# Patient Record
Sex: MALE | Race: WHITE | Employment: PART TIME | URBAN - METROPOLITAN AREA
[De-identification: names, ages, dates, MRNs, and addresses within clinical notes are randomized per-mention and may not be internally consistent; named-entity substitution may affect disease eponyms.]

---

## 2016-10-04 LAB — EF %, EXTERNAL: NORMAL

## 2017-01-03 ENCOUNTER — HOSPITAL ENCOUNTER (OUTPATIENT)
Dept: ULTRASOUND IMAGING | Age: 22
Discharge: HOME | End: 2017-01-03
Attending: INTERNAL MEDICINE
Payer: COMMERCIAL

## 2017-01-03 DIAGNOSIS — R74.8 ELEVATED LIVER ENZYMES: ICD-10-CM

## 2017-01-03 PROCEDURE — 76700 US EXAM ABDOM COMPLETE: CPT

## 2017-01-20 ENCOUNTER — OFFICE VISIT (OUTPATIENT)
Dept: HEMATOLOGY | Age: 22
End: 2017-01-20

## 2017-01-20 VITALS
WEIGHT: 158 LBS | TEMPERATURE: 96.6 F | DIASTOLIC BLOOD PRESSURE: 70 MMHG | OXYGEN SATURATION: 93 % | SYSTOLIC BLOOD PRESSURE: 94 MMHG | HEART RATE: 72 BPM | HEIGHT: 63 IN | BODY MASS INDEX: 28 KG/M2

## 2017-01-20 DIAGNOSIS — R74.8 ELEVATED LIVER ENZYMES: Primary | ICD-10-CM

## 2017-01-20 NOTE — PROGRESS NOTES
Louie Walton MD, Chyrel Goltz, NP [place], PA-C Rufina Hugh, MD, FACP, MD Meron Hobbs NP Lendia Oiler, NP        [address], [address] [place]  22.     [phone]     FAX: [address], [id number] [place], [place] [address], [address]     [phone]     FAX: [phone]         Patient Care Team:  Rosalia Kim MD as PCP - General (Family Practice)      Problem List  Date Reviewed: 12/23/2016          Codes Class Noted    Delay, puberty ICD-10-CM: E30.0  ICD-9-CM: 259.0  11/3/2016        Elevated LFTs ICD-10-CM: R79.89  ICD-9-CM: 790.6  11/3/2016        Eczema ICD-10-CM: L30.9  ICD-9-CM: 692.9  8/19/2013        Asthma ICD-10-CM: J45.909  ICD-9-CM: 493.90  Unknown        ADD (attention deficit disorder with hyperactivity) ICD-9-CM: 314.01  Unknown                Hawa Leonard returns to the [facility] for management of elevated liver transaminases and elevated alkaline phosphatase. The active problem list, all pertinent past medical history, medications, radiologic findings and laboratory findings related to the liver disorder were reviewed with the patient. The patient is a 24 y.o.  male who was first noted to have abnormalities in liver transaminases and alkaline phosphate in 3/2016 when he was living in [facility]. Serologic evaluation for markers of chronic liver disease were all negative. Ultrasound of the liver was performed in 1/2017. Results suggest that the liver is normal.      Assessment of liver fibrosis was performed in the office today with Fibroscan. The result was 4.6 kPa which correlates with none-mild fibrosis.     The patient had not started any new medications within 3 months preceeding the elevation in liver chemistries. The most recent laboratory studies indicate that the liver transaminases are elevated, ALP is elevated, tests of hepatic synthetic and metabolic function are normal, and the platelet count is normal.    The patient has no symptoms which could be attributed to the liver disorder. The patient completes all daily activities without any functional limitations. The patient has not experienced fatigue, pain in the right side over the liver, yellowing of the eyes or skin, dark urine,       ALLERGIES  No Known Allergies    MEDICATIONS  Current Outpatient Prescriptions   Medication Sig    pantoprazole (PROTONIX) 40 mg tablet Take 1 Tab by mouth daily. Indications: HEARTBURN    montelukast (SINGULAIR) 10 mg tablet Take 1 Tab by mouth daily.  fluticasone (FLOVENT HFA) 44 mcg/actuation inhaler Take 2 Puffs by inhalation two (2) times a day.  triamcinolone acetonide (KENALOG) 0.1 % topical cream Apply  to affected area two (2) times a day. use thin layer    budesonide (PULMICORT) 180 mcg/actuation AePB inhaler Take 2 Puffs by inhalation two (2) times a day.  albuterol (PROVENTIL VENTOLIN) 2.5 mg /3 mL (0.083 %) nebulizer solution 3 mL by Nebulization route every four (4) hours as needed for Wheezing or Shortness of Breath.  escitalopram (LEXAPRO) 20 mg tablet Take 20 mg by mouth daily.  lithium 300 mg tablet Take 300 mg by mouth two (2) times a day.  methylphenidate (CONCERTA) 18 mg CR tablet Take  by mouth every morning.  loratadine (CLARITIN) 10 mg tablet Take 10 mg by mouth daily. No current facility-administered medications for this visit. SYSTEM REVIEW NOT RELATED TO LIVER DISEASE OR REVIEWED ABOVE:  Constitution systems: Negative for fever, chills, weight gain, weight loss. Eyes: Negative for visual changes. ENT: Negative for sore throat, painful swallowing. Respiratory: Negative for cough, hemoptysis, SOB.    Cardiology: Negative for chest pain, palpitations. GI:  Negative for constipation or diarrhea. : Negative for urinary frequency, dysuria, hematuria, nocturia. Skin: Negative for rash. Hematology: Negative for easy bruising, blood clots. Musculo-skelatal: Negative for back pain, muscle pain, weakness. Neurologic: Negative for headaches, dizziness, vertigo, memory problems not related to HE. Psychology: Negative for anxiety, depression. FAMILY HISTORY:  The patient is adopted from [place]. SOCIAL HISTORY:  The patient has never been . The patient has no children. The patient will occasionally smoke 1-2 cigarettes daily. The patient consumes alcohol on social occasions never in excess. The patient currently works full time at [employer]. PHYSICAL EXAMINATION:  Visit Vitals    BP 94/70    Pulse 72    Temp 96.6 °F (35.9 °C) (Oral)    Ht 5' 3\" (1.6 m)    Wt 158 lb (71.7 kg)    SpO2 93%    BMI 27.99 kg/m2     General: No acute distress. Eyes: Sclera anicteric. ENT: No oral lesions. Thyroid normal.  Nodes: No adenopathy. Skin: No spider angiomata. No jaundice. No palmar erythema. Respiratory: Lungs clear to auscultation. Cardiovascular: Regular heart rate. No murmurs. No JVD. Abdomen: Soft non-tender. Liver size normal to percussion/palpation. Spleen not palpable. No obvious ascites. Extremities: No edema. No muscle wasting. No gross arthritic changes. Neurologic: Alert and oriented. Cranial nerves grossly intact. No asterixis.     LABORATORY STUDIES:  [facility] of [address] [place] [address] 10/26/2016   WBC 3.4 - 10.8 x10E3/uL 6.2   ANC 1.4 - 7.0 x10E3/uL 3.5   HGB 12.6 - 17.7 g/dL 14.3    - 379 x10E3/uL 240   AST 0 - 40 IU/L 103 (H)   ALT 0 - 44 IU/L 222 (H)   Alk Phos 39 - 117 IU/L 210 (H)   Bili, Total 0.0 - 1.2 mg/dL 0.3   Albumin 3.5 - 5.5 g/dL 4.3   BUN 6 - 20 mg/dL 15   Creat 0.76 - 1.27 mg/dL 0.56 (L)   Na 136 - 144 mmol/L 140   K 3.5 - 5.2 mmol/L 4.2   Cl 97 - 106 mmol/L 102   CO2 18 - 29 mmol/L 21   Glucose 65 - 99 mg/dL 103 (H)     SEROLOGIES:  Serologies Latest Ref Rng & Units 12/23/2016   Hep A Ab, Total Negative Positive (A)   Hep B Surface Ag Negative Negative   Hep B Core Ab, Total Negative Negative   Hep B Surface AB QL  Reactive   Hep C Ab 0.0 - 0.9 s/co ratio <0.1   Ferritin 30 - 400 ng/mL 161   Iron % Saturation 15 - 55 % 21   FELY, IFA  Negative   C-ANCA Neg:<1:20 titer <1:20   P-ANCA Neg:<1:20 titer <1:20   ANCA Neg:<1:20 titer <1:20   ASMCA 0 - 19 Units 12   M2 Ab 0.0 - 20.0 Units 6.3   Ceruloplasmin 16.0 - 31.0 mg/dL 32.2 (H)   Alpha-1 antitrypsin level 90 - 200 mg/dL 121     LIVER HISTOLOGY:  Not available or performed    ENDOSCOPIC PROCEDURES:  Not available or performed    RADIOLOGY:  1/2017. Ultrasound of liver. Normal appearing liver. No liver mass lesions. OTHER TESTING:  Not available or performed    ASSESSMENT AND PLAN:  Persistent elevation in liver transaminases and ALP of unclear etiology at this time. Liver function is normal.  The platelet count is normal.      Serologic testing to help define the cause of the laboratory abnormality were all negative. The most likely causes for the liver chemistry abnormalities were discussed with the patient and include fatty liver disease, or an immune liver disorders related to delayed puberty and immune injry to the pituitary gland.,     Will perform an ERCP to exclude PSC. If the ERCP is negative he will need a liver biopsy and this will likely show autoimmune hepatitis. The patient was directed to continue all current medications at the current dosages. There are no contraindications for the patient to take any medications that are necessary for treatment of other medical issues. The patient was counseled regarding alcohol consumption. Emma have called Dr Wilma Shah for viral hepatitis A and B is not needed.   The patient has serologic evidence of prior exposure or vaccination with immunity. The patient tells me he has moved to [place], [place]. Will refer to Dr Allison Doherty. Will fax records. All of the above issues were discussed with the patient. All questions were answered. The patient expressed a clear understanding of the above. [address] [address] in 4 weeks for Fibroscan, to review all data and determine the treatment plan.     Pepe Monroy MD  [facility] [place] [address] [address] [address]  [address].  [phone]

## 2017-10-05 ENCOUNTER — OFFICE VISIT (OUTPATIENT)
Dept: FAMILY MEDICINE CLINIC | Age: 22
End: 2017-10-05

## 2017-10-05 VITALS
DIASTOLIC BLOOD PRESSURE: 85 MMHG | WEIGHT: 153.13 LBS | HEIGHT: 63 IN | HEART RATE: 69 BPM | TEMPERATURE: 98.6 F | BODY MASS INDEX: 27.13 KG/M2 | RESPIRATION RATE: 20 BRPM | OXYGEN SATURATION: 98 % | SYSTOLIC BLOOD PRESSURE: 122 MMHG

## 2017-10-05 DIAGNOSIS — H69.83 DYSFUNCTION OF BOTH EUSTACHIAN TUBES: Primary | ICD-10-CM

## 2017-10-05 DIAGNOSIS — J45.41 MODERATE PERSISTENT ASTHMA WITH ACUTE EXACERBATION: ICD-10-CM

## 2017-10-05 RX ORDER — FLUTICASONE PROPIONATE 44 UG/1
2 AEROSOL, METERED RESPIRATORY (INHALATION) 2 TIMES DAILY
Qty: 1 INHALER | Refills: 3 | Status: SHIPPED | OUTPATIENT
Start: 2017-10-05 | End: 2019-10-03

## 2017-10-05 RX ORDER — CETIRIZINE HCL 10 MG
10 TABLET ORAL DAILY
Qty: 30 TAB | Refills: 5 | Status: SHIPPED | OUTPATIENT
Start: 2017-10-05 | End: 2019-10-03

## 2017-10-05 RX ORDER — CITALOPRAM 10 MG/1
TABLET ORAL DAILY
COMMUNITY
End: 2019-10-03

## 2017-10-05 NOTE — MR AVS SNAPSHOT
Visit Information Date & Time Provider Department Dept. Phone Encounter #  
 10/5/2017  1:15 PM Cordell Sanchez MD [address] [facility] [phone] [id number] Follow-up Instructions Return if symptoms worsen or fail to improve. Upcoming Health Maintenance Date Due Pneumococcal 19-64 Medium Risk (1 of 1 - PPSV23) 11/17/2013 DTaP/Tdap/Td series (1 - Tdap) 11/17/2015 INFLUENZA AGE 9 TO ADULT 8/1/2017 Allergies as of 10/5/2017  Review Complete On: 10/5/2017 By: Cordell Sanchez MD  
 No Known Allergies Current Immunizations  Never Reviewed No immunizations on file. Not reviewed this visit You Were Diagnosed With   
  
 Codes Comments Dysfunction of both eustachian tubes    -  Primary ICD-10-CM: P66.46 ICD-9-CM: 381.81 Moderate persistent asthma with acute exacerbation     ICD-10-CM: J45.41 
ICD-9-CM: 493.92 Vitals BP Pulse Temp Resp Height(growth percentile) Weight(growth percentile) 122/85 (BP 1 Location: Right arm, BP Patient Position: Sitting) 69 98.6 °F (37 °C) (Oral) 20 5' 3\" (1.6 m) 153 lb 2 oz (69.5 kg) SpO2 BMI Smoking Status 98% 27.12 kg/m2 Current Every Day Smoker Vitals History BMI and BSA Data Body Mass Index Body Surface Area  
 27.12 kg/m 2 1.76 m 2 Preferred Pharmacy Pharmacy Name Phone [id number] [phone] Your Updated Medication List  
  
   
This list is accurate as of: 10/5/17  1:56 PM.  Always use your most recent med list.  
  
  
  
  
 albuterol 2.5 mg /3 mL (0.083 %) nebulizer solution Commonly known as:  PROVENTIL VENTOLIN  
3 mL by Nebulization route every four (4) hours as needed for Wheezing or Shortness of Breath. budesonide 180 mcg/actuation Aepb inhaler Commonly known as:  PULMICORT Take 2 Puffs by inhalation two (2) times a day. CeleXA 10 mg tablet Generic drug:  citalopram  
 Take  by mouth daily. cetirizine 10 mg tablet Commonly known as:  ZYRTEC Take 1 Tab by mouth daily. fluticasone 44 mcg/actuation inhaler Commonly known as:  FLOVENT HFA Take 2 Puffs by inhalation two (2) times a day. lithium carbonate 300 mg tablet Take 300 mg by mouth two (2) times a day. methylphenidate HCl 18 mg CR tablet Commonly known as:  CONCERTA Take  by mouth every morning.  
  
 montelukast 10 mg tablet Commonly known as:  SINGULAIR Take 1 Tab by mouth daily. pantoprazole 40 mg tablet Commonly known as:  PROTONIX Take 1 Tab by mouth daily. Indications: HEARTBURN  
  
 triamcinolone acetonide 0.1 % topical cream  
Commonly known as:  KENALOG Apply  to affected area two (2) times a day. use thin layer Prescriptions Sent to Pharmacy Refills  
 fluticasone (FLOVENT HFA) 44 mcg/actuation inhaler 3 Sig: Take 2 Puffs by inhalation two (2) times a day. Class: Normal  
 Pharmacy: [facility] Ph #: [phone] Route: Inhalation  
 budesonide (PULMICORT) 180 mcg/actuation aepb inhaler 11 Sig: Take 2 Puffs by inhalation two (2) times a day. Class: Normal  
 Pharmacy: [facility] Ph #: [phone] Route: Inhalation  
 cetirizine (ZYRTEC) 10 mg tablet 5 Sig: Take 1 Tab by mouth daily. Class: Normal  
 Pharmacy: [facility] Ph #: [phone] Route: Oral  
  
Follow-up Instructions Return if symptoms worsen or fail to improve. Introducing [facility]! Dear Shukri Veras: Thank you for requesting a [employer] account. Our records indicate that you already have an active [employer] account. You can access your account anytime at [email or web address] Did you know that you can access your hospital and ER discharge instructions at any time in [employer]? You can also review all of your test results from your hospital stay or ER visit. Additional Information If you have questions, please visit the Frequently Asked Questions section of the [employer] website at [email or web address] Remember, [employer] is NOT to be used for urgent needs. For medical emergencies, dial 911. Now available from your iPhone and Android! Please provide this summary of care documentation to your next provider. Your primary care clinician is listed as TITA LEYVA. If you have any questions after today's visit, please call [phone].

## 2017-10-05 NOTE — PROGRESS NOTES
1. Have you been to the ER, urgent care clinic since your last visit? Hospitalized since your last visit? No    2. Have you seen or consulted any other health care providers outside of the [facility] since your last visit? Include any pap smears or colon screening. No   Chief Complaint   Patient presents with   [place] in Ear     both ears clogged    Cyst     on right knee         Chief Complaint   Patient presents with   [place] [facility] in Ear     both ears clogged    Cyst     on right knee     He is a 24 y.o. male who presents for evalution. Reviewed PmHx, RxHx, FmHx, SocHx, AllgHx and updated and dated in the chart. Patient Active Problem List    Diagnosis    Delay, puberty    Elevated LFTs    Eczema    Asthma    ADD (attention deficit disorder with hyperactivity)       Review of Systems - negative except as listed above in the HPI    Objective:     Vitals:    10/05/17 1326   BP: 122/85   Pulse: 69   Resp: 20   Temp: 98.6 °F (37 °C)   TempSrc: Oral   SpO2: 98%   Weight: 153 lb 2 oz (69.5 kg)   Height: 5' 3\" (1.6 m)     Physical Examination: General appearance - alert, well appearing, and in no distress  Ears - bilateral TM's and external ear canals normal  Nose - normal and patent, no erythema, discharge or polyps    Assessment/ Plan:   Diagnoses and all orders for this visit:    1. Dysfunction of both eustachian tubes  -     cetirizine (ZYRTEC) 10 mg tablet; Take 1 Tab by mouth daily.  -add rx    2. Moderate persistent asthma with acute exacerbation  -     fluticasone (FLOVENT HFA) 44 mcg/actuation inhaler; Take 2 Puffs by inhalation two (2) times a day. -     budesonide (PULMICORT) 180 mcg/actuation aepb inhaler; Take 2 Puffs by inhalation two (2) times a day. Follow-up Disposition:  Return if symptoms worsen or fail to improve. I have discussed the diagnosis with the patient and the intended plan as seen in the above orders. The patient understands and agrees with the plan. The patient has received an after-visit summary and questions were answered concerning future plans. Medication Side Effects and Warnings were discussed with patient  Patient Labs were reviewed and or requested:  Patient Past Records were reviewed and or requested    Radha Euceda M.D. There are no Patient Instructions on file for this visit.

## 2019-02-26 ENCOUNTER — OFFICE VISIT (OUTPATIENT)
Dept: HEMATOLOGY | Age: 24
End: 2019-02-26

## 2019-02-26 VITALS
OXYGEN SATURATION: 98 % | TEMPERATURE: 96.2 F | SYSTOLIC BLOOD PRESSURE: 106 MMHG | BODY MASS INDEX: 29.45 KG/M2 | HEIGHT: 63 IN | DIASTOLIC BLOOD PRESSURE: 74 MMHG | WEIGHT: 166.2 LBS | HEART RATE: 66 BPM

## 2019-02-26 DIAGNOSIS — R79.89 ELEVATED LFTS: Primary | ICD-10-CM

## 2019-02-26 DIAGNOSIS — K76.0 NAFLD (NONALCOHOLIC FATTY LIVER DISEASE): ICD-10-CM

## 2019-02-26 LAB
BASOPHILS # BLD AUTO: 0 X10E3/UL (ref 0–0.2)
BASOPHILS NFR BLD AUTO: 0 %
EOSINOPHIL # BLD AUTO: 0.1 X10E3/UL (ref 0–0.4)
EOSINOPHIL NFR BLD AUTO: 4 %
ERYTHROCYTE [DISTWIDTH] IN BLOOD BY AUTOMATED COUNT: 14.5 % (ref 12.3–15.4)
HCT VFR BLD AUTO: 47.4 % (ref 37.5–51)
HGB BLD-MCNC: 16.4 G/DL (ref 13–17.7)
IMM GRANULOCYTES # BLD AUTO: 0 X10E3/UL (ref 0–0.1)
IMM GRANULOCYTES NFR BLD AUTO: 0 %
LYMPHOCYTES # BLD AUTO: 1 X10E3/UL (ref 0.7–3.1)
LYMPHOCYTES NFR BLD AUTO: 26 %
MCH RBC QN AUTO: 28.4 PG (ref 26.6–33)
MCHC RBC AUTO-ENTMCNC: 34.6 G/DL (ref 31.5–35.7)
MCV RBC AUTO: 82 FL (ref 79–97)
MONOCYTES # BLD AUTO: 0.7 X10E3/UL (ref 0.1–0.9)
MONOCYTES NFR BLD AUTO: 19 %
NEUTROPHILS # BLD AUTO: 2 X10E3/UL (ref 1.4–7)
NEUTROPHILS NFR BLD AUTO: 51 %
PLATELET # BLD AUTO: 178 X10E3/UL (ref 150–379)
RBC # BLD AUTO: 5.77 X10E6/UL (ref 4.14–5.8)
WBC # BLD AUTO: 3.8 X10E3/UL (ref 3.4–10.8)

## 2019-02-26 RX ORDER — TESTOSTERONE 50 MG/5G
5 GEL TRANSDERMAL DAILY
COMMUNITY
End: 2019-10-03

## 2019-02-26 NOTE — LETTER
4/6/19 Patient: Andrea Gutierrez YOB: 1995 Date of Visit: 2/26/2019 Alessandro Foley MD 
[address]  [address] [facility] [address] VIA In Basket Dear Alessandro Foley MD, Thank you for referring Mr. Macknezie Suarez to [address] [place] [address] for evaluation. My notes for this consultation are attached. If you have questions, please do not hesitate to call me. I look forward to following your patient along with you. Sincerely, Emeka Kimble MD

## 2019-02-26 NOTE — PROGRESS NOTES
Chief Complaint   Patient presents with    Follow-up     elevated liver enzymes     Visit Vitals  /74 (BP 1 Location: Right arm, BP Patient Position: Sitting)   Pulse 66   Temp 96.2 °F (35.7 °C) (Tympanic)   Ht 5' 3\" (1.6 m)   Wt 166 lb 3.2 oz (75.4 kg)   SpO2 98%   BMI 29.44 kg/m²     3 most recent PHQ Screens 10/5/2017   Little interest or pleasure in doing things Not at all   Feeling down, depressed, irritable, or hopeless Not at all   Total Score PHQ 2 0     Abuse Screening Questionnaire 2/26/2019   Do you ever feel afraid of your partner? N   Are you in a relationship with someone who physically or mentally threatens you? N   Is it safe for you to go home?  Malick Saleem

## 2019-02-26 NOTE — H&P
[address] [facility] [address] 137 [facility] Mi Dunia Bey MD, Natalie Burr FAASLD IFRAH Ambrose, GENEVIEVE Prakash, [facility]   Kavon Pickett, IFRAH Urban, IFRAH Reid [facility] 136 
  at [place] [address] [facility] [place], [address] [address]  22. 
  [phone] FAX: [address] [place] [address] Avenue 
  [facility] 
  [address], [address] 98 [facility], [address] 
  [phone] FAX: [phone] Patient Care Team: 
Paulina Heath MD as PCP - [facility]) Problem List  Date Reviewed: 10/5/2017 Codes Class Noted Delay, puberty ICD-10-CM: E30.0 ICD-9-CM: 259.0  11/3/2016 NAFLD (nonalcoholic fatty liver disease) ICD-10-CM: K76.0 ICD-9-CM: 571.8  11/3/2016 Eczema ICD-10-CM: L30.9 ICD-9-CM: 692.9  8/19/2013 Asthma ICD-10-CM: L94.069 ICD-9-CM: 493.90  Unknown ADD (attention deficit disorder with hyperactivity) ICD-9-CM: 314.01  Unknown Paul Unger returns to the [place] [address] for management of non-alcoholic fatty liver disease (NAFLD). The active problem list, all pertinent past medical history, medications, liver histology, radiologic findings and laboratory findings related to the liver disorder were reviewed with the patient. [facility]. The patient is a 24 y.o.  male who was first noted to have abnormalities in liver transaminases and alkaline phosphate in 3/2016 when he was living in [facility]. Serologic evaluation for markers of chronic liver disease was negative. The patient moved to [place] and I referred him to Dr Dawit Mac. A liver biopsy was performed in 3/2017. This demonstrated NAFLD with stage 2 fibrosis. The patient has no symptoms which could be attributed to the liver disorder. The patient completes all daily activities without any functional limitations. The patient has not experienced fatigue, pain in the right side over the liver, yellowing of the eyes or skin, dark urine, ASSESSMENT AND PLAN: 
NAFLD The diagnosis is based upon liver biopsy, serologic studies that are negative for other causes of chronic liver disease,  
23A liver biopsy performed in 3/2017 shows features of mild HINOJOSA with pericellular fibrosis. Overall the biopsy can be classified as mild HINOJOSA. There is balloning of hepatocytes but this is mild. There is inflammation but this is also mild. Liver transaminases are normal.  ALP is elevated. Liver function is normal.  The platelet count is normal.   
 
The patients weight is up about 10-15 pounds since we last saw him in 2017 and from when the liver biopsy was performed. He may therefore now have more severe HINOJOSA. There is currently no FDA approved medical treatment for fatty liver, NALFD or HINOJOSA. The patient was counseled regarding diet and exercise to achieve weight loss. The best diet for patients with fatty liver is one very low in carbohydrates and enriched with protein such as an Chriss's program.   
 
The patient was told not to consume any food products and drinks containing fructose as this enhances hepatic fat synthesis. There is no medication or vitamin supplements that we advocate for HINOJOSA. Using glitazones in patients without diabetes mellitus has been shown to reduce fat content in the liver but has no effect on fibrosis and is associated with weight gain. Vitamin E has also been used but the data is not very good and most experts no longer advocate this. The only medical treatments for HINOJOSA are though clinical trials. The patient would be a good candidate for enrollment into a clinical trial if found to have HINOJOSA. The need to perform an assessment of liver fibrosis was discussed with the patient. The Fibroscan can assess liver fibrosis and determine if a patient has advanced fibrosis or cirrhosis without the need for liver biopsy. This will be performed at the next office visit. If the Fibroscan suggests advanced fibrosis then a liver biopsy should be considered. Treatment of other medical problems in patients with chronic liver disease There are no contraindications for the patient to take most medications that are necessary for treatment of other medical issues. Counseling for alcohol in patients with chronic liver disease The patient was counseled regarding alcohol consumption and the effect of alcohol on chronic liver disease. The patient does not consume any significant amount of alcohol. Vaccinations Vaccination for viral hepatitis A and B is not needed. The patient has serologic evidence of prior exposure or vaccination with immunity. Routine vaccinations against other bacterial and viral agents can be performed as indicated. Annual flu vaccination should be administered if indicated. ALLERGIES No Known Allergies MEDICATIONS Current Outpatient Medications Medication Sig  testosterone (ANDROGEL) 50 mg/5 gram (1 %) gel 5 g by TransDERmal route daily. 80mg daily  citalopram (CELEXA) 10 mg tablet Take  by mouth daily.  fluticasone (FLOVENT HFA) 44 mcg/actuation inhaler Take 2 Puffs by inhalation two (2) times a day.  budesonide (PULMICORT) 180 mcg/actuation aepb inhaler Take 2 Puffs by inhalation two (2) times a day.  cetirizine (ZYRTEC) 10 mg tablet Take 1 Tab by mouth daily.  pantoprazole (PROTONIX) 40 mg tablet Take 1 Tab by mouth daily. Indications: HEARTBURN  
 montelukast (SINGULAIR) 10 mg tablet Take 1 Tab by mouth daily.  triamcinolone acetonide (KENALOG) 0.1 % topical cream Apply  to affected area two (2) times a day. use thin layer  albuterol (PROVENTIL VENTOLIN) 2.5 mg /3 mL (0.083 %) nebulizer solution 3 mL by Nebulization route every four (4) hours as needed for Wheezing or Shortness of Breath.  lithium 300 mg tablet Take 300 mg by mouth two (2) times a day.  methylphenidate (CONCERTA) 18 mg CR tablet Take  by mouth every morning. No current facility-administered medications for this visit. SYSTEM REVIEW NOT RELATED TO LIVER DISEASE OR REVIEWED ABOVE: 
Constitution systems: Negative for fever, chills, weight gain, weight loss. Eyes: Negative for visual changes. ENT: Negative for sore throat, painful swallowing. Respiratory: Negative for cough, hemoptysis, SOB. Cardiology: Negative for chest pain, palpitations. GI:  Negative for constipation or diarrhea. : Negative for urinary frequency, dysuria, hematuria, nocturia. Skin: Negative for rash. Hematology: Negative for easy bruising, blood clots. Musculo-skelatal: Negative for back pain, muscle pain, weakness. Neurologic: Negative for headaches, dizziness, vertigo, memory problems not related to HE. Psychology: Negative for anxiety, depression. FAMILY HISTORY: 
The patient is adopted from [place]. SOCIAL HISTORY: 
The patient has never been . The patient has no children. The patient stopped using tobacco in 2/2019. The patient consumes alcohol on social occasions never in excess. The patient currently works full time for an [employer]. PHYSICAL EXAMINATION: 
Visit Vitals /74 (BP 1 Location: Right arm, BP Patient Position: Sitting) Pulse 66 Temp 96.2 °F (35.7 °C) (Tympanic) Ht 5' 3\" (1.6 m) Wt 166 lb 3.2 oz (75.4 kg) SpO2 98% BMI 29.44 kg/m² General: No acute distress. Eyes: Sclera anicteric. ENT: No oral lesions. Thyroid normal. 
Nodes: No adenopathy. Skin: No spider angiomata. No jaundice. No palmar erythema. Respiratory: Lungs clear to auscultation. Cardiovascular: Regular heart rate. No murmurs. No JVD. Abdomen: Soft non-tender. Liver size normal to percussion/palpation. Spleen not palpable. No obvious ascites. Extremities: No edema. No muscle wasting. No gross arthritic changes. Neurologic: Alert and oriented. Cranial nerves grossly intact. No asterixis. LABORATORY STUDIES: 
Liver [place] of [address] Units 2/26/2019 12/23/2016 WBC 3.4 - 10.8 x10E3/uL 3.8 6.7 ANC 1.4 - 7.0 x10E3/uL 2.0 3.5 HGB 13.0 - 17.7 g/dL 16.4 14.4  - 379 x10E3/uL 178 229 AST 0 - 40 IU/L 38 90 (H) ALT 0 - 44 IU/L 33 162 (H) Alk Phos 39 - 117 IU/L 168 (H) 176 (H) Bili, Total 0.0 - 1.2 mg/dL 0.3 0.3 Bili, Direct 0.00 - 0.40 mg/dL 0.11 0.09 Albumin 3.5 - 5.5 g/dL 4.2 4.2 BUN 6 - 20 mg/dL 10 17 Creat 0.76 - 1.27 mg/dL 0.69 (L) 0.58 (L) Na 134 - 144 mmol/L 141 141  
K 3.5 - 5.2 mmol/L 4.8 4.2 Cl 96 - 106 mmol/L 102 102 CO2 20 - 29 mmol/L 23 23 Glucose 65 - 99 mg/dL 84 81 SEROLOGIES: 
Serologies Latest Ref Rng & Units 12/23/2016 Hep A Ab, Total Negative Positive (A) Hep B Surface Ag Negative Negative Hep B Core Ab, Total Negative Negative Hep B Surface AB QL  Reactive Hep C Ab 0.0 - 0.9 s/co ratio <0.1 Ferritin 30 - 400 ng/mL 161 Iron % Saturation 15 - 55 % 21 FELY, IFA  Negative C-ANCA Neg:<1:20 titer <1:20  
P-ANCA Neg:<1:20 titer <1:20  
ANCA Neg:<1:20 titer <1:20  
ASMCA 0 - 19 Units 12  
M2 Ab 0.0 - 20.0 Units 6.3 Ceruloplasmin 16.0 - 31.0 mg/dL 32.2 (H) Alpha-1 antitrypsin level 90 - 200 mg/dL 121 LIVER HISTOLOGY: 
3/2017. Reviewed by Dr Adan Chase at [place]. HINOJOSA with mild inflammation and mild ballooning. Pericellular fibrosis. ENDOSCOPIC PROCEDURES: 
Not available or performed RADIOLOGY: 
1/2017. Ultrasound of liver. Normal appearing liver. No liver mass lesions. OTHER TESTING: 
Not available or performed FOLLOW-UP: 
 All of the issues listed above in the Assessment and Plan were discussed with the patient. All questions were answered. The patient expressed a clear understanding of the above. [address] [place] [address] in 3 months for Fibroscan to review all data and determine the treatment plan. MD Franklin Bass [facility] 136 [address] [place] [address], [address] [facility] 22. 
[phone] [address] [facility]

## 2019-02-27 LAB
ACTIN IGG SERPL-ACNC: 12 UNITS (ref 0–19)
ALBUMIN SERPL-MCNC: 4.2 G/DL (ref 3.5–5.5)
ALP SERPL-CCNC: 168 IU/L (ref 39–117)
ALT SERPL-CCNC: 33 IU/L (ref 0–44)
AST SERPL-CCNC: 38 IU/L (ref 0–40)
BILIRUB DIRECT SERPL-MCNC: 0.11 MG/DL (ref 0–0.4)
BILIRUB SERPL-MCNC: 0.3 MG/DL (ref 0–1.2)
BUN SERPL-MCNC: 10 MG/DL (ref 6–20)
BUN/CREAT SERPL: 14 (ref 9–20)
CALCIUM SERPL-MCNC: 9.2 MG/DL (ref 8.7–10.2)
CHLORIDE SERPL-SCNC: 102 MMOL/L (ref 96–106)
CO2 SERPL-SCNC: 23 MMOL/L (ref 20–29)
CREAT SERPL-MCNC: 0.69 MG/DL (ref 0.76–1.27)
GLUCOSE SERPL-MCNC: 84 MG/DL (ref 65–99)
LKM-1 AB SER-ACNC: 0.7 UNITS (ref 0–20)
POTASSIUM SERPL-SCNC: 4.8 MMOL/L (ref 3.5–5.2)
PROT SERPL-MCNC: 7.4 G/DL (ref 6–8.5)
SODIUM SERPL-SCNC: 141 MMOL/L (ref 134–144)

## 2019-02-28 LAB
ANA TITR SER IF: NEGATIVE {TITER}
SOLUBLE LIVER IGG SER IA-ACNC: 1.5 UNITS (ref 0–20)

## 2019-04-08 ENCOUNTER — TELEPHONE (OUTPATIENT)
Dept: HEMATOLOGY | Age: 24
End: 2019-04-08

## 2019-04-08 NOTE — TELEPHONE ENCOUNTER
----- Message from Red Whyte MD sent at 4/6/2019  8:40 PM EDT -----  Call and tell him we got LBX report from Antione carlson. Was reviewed by a friend of mine so I do not need to see slides. Tell him he missed FU appt. Reschedule to see me for 1-2 months with FS.   EMILIANO

## 2019-04-08 NOTE — TELEPHONE ENCOUNTER
Contacted patient. Informed him Dr. Kat Melendez received the [address] report from [place]. No slides will be needed per Dr. Kat Melendez. Patient to schedule follow up fibroscan in 1-2 months. Appointment scheduled for June 7, 2019. Mailed patient appointment reminder as well as instructions prior to fiborscan. Patient expressed an understanding,and had no further questions.

## 2019-06-11 ENCOUNTER — OFFICE VISIT (OUTPATIENT)
Dept: HEMATOLOGY | Age: 24
End: 2019-06-11

## 2019-06-11 VITALS
SYSTOLIC BLOOD PRESSURE: 111 MMHG | TEMPERATURE: 97.3 F | HEIGHT: 63 IN | OXYGEN SATURATION: 99 % | WEIGHT: 155.4 LBS | HEART RATE: 74 BPM | BODY MASS INDEX: 27.54 KG/M2 | DIASTOLIC BLOOD PRESSURE: 82 MMHG

## 2019-06-11 DIAGNOSIS — K76.0 NAFLD (NONALCOHOLIC FATTY LIVER DISEASE): Primary | ICD-10-CM

## 2019-06-11 RX ORDER — LORATADINE 10 MG/1
10 TABLET ORAL DAILY
COMMUNITY

## 2019-06-11 NOTE — PROGRESS NOTES
[address] [place] [address] [address], [place], [address] [address], [facility], [place]       Reena Estes, NP    Mary Spear, GENEVIEVE Jimenez, [facility]   Mimi Dial, IFRAH Stockton, IFRAH Echols Jeremy De Ruelas 136    at [address], [address] [address]  22. [phone]    FAX: [address] [place] [address] Avenue    [address], [address] [facility], [address]    [phone]    FAX: [phone]       Patient Care Team:  Elio Moffett MD as PCP - General (Family Practice)      Problem List  Date Reviewed: 4/6/2019          Codes Class Noted    Delay, puberty ICD-10-CM: E30.0  ICD-9-CM: 259.0  11/3/2016        NAFLD (nonalcoholic fatty liver disease) ICD-10-CM: K76.0  ICD-9-CM: 571.8  11/3/2016        Eczema ICD-10-CM: L30.9  ICD-9-CM: 692.9  8/19/2013        Asthma ICD-10-CM: J45.909  ICD-9-CM: 493.90  Unknown        ADD (attention deficit disorder with hyperactivity) ICD-9-CM: 314.01  Unknown                Chapin Ingram returns to the [address] for management of non-alcoholic fatty liver disease (NAFLD). The active problem list, all pertinent past medical history, medications, liver histology, radiologic findings and laboratory findings related to the liver disorder were reviewed with the patient. This is the first appointment at [place] [address] since 1/2017. The patient is a 24 y.o.  male who was first noted to have abnormalities in liver transaminases and alkaline phosphate in 3/2016 when he was living in [facility]. Serologic evaluation for markers of chronic liver disease was negative. The patient moved to [place] and I referred him to Dr Doc Burrell. A liver biopsy was performed in 3/2017. This demonstrated NAFLD with stage 2 fibrosis.     Assessment of liver fibrosis was performed in the office today with Fibroscan. The result was 8.2 kPa which correlates with stage 2 septal fibrosis. The CAP score of 306 suggests fatty liver. The patient has no symptoms which could be attributed to the liver disorder. The patient completes all daily activities without any functional limitations. The patient has not experienced fatigue, pain in the right side over the liver, yellowing of the eyes or skin, dark urine,       ASSESSMENT AND PLAN:  NAFLD  The diagnosis is based upon liver biopsy, Fiboscan CAP score, serologic studies that are negative for other causes of chronic liver disease,   A liver biopsy performed in 3/2017 shows features of mild HINOJOSA with pericellular fibrosis. Elastography performed in 6/2019 suggests stage 2 septal fibrosis. Have performed laboratory testing to monitor liver function and degree of liver injury. This included BMP, hepatic panel, CBC with platelet count, INR. Liver transaminases are normal.  ALP is normal.  Liver function is normal.  The platelet count is normal.      Serologic testing for causes of chronic liver disease were negative. Only a liver biopsy can confirm if the patient does have fatty liver and differentiate NAFL from HINOJOSA. The treatment is the same; weight reduction. If the liver biopsy demonstrates HINOJOSA the patient could be eligible for enrollment into clinical trials for treatment of HINOJOSA. The need to perform a liver biopsy to help determine the cause and severity of the liver test abnormalities was discussed. The risks of performing the liver biopsy including pain, puncture of the lung, gallbladder, intestine or kidney and bleeding were discussed. The patient has decided to have a liver biopsy. This will be scheduled. Counseling for diet and weight loss in patients with confirmed or suspected NAFLD  There is currently no FDA approved medical treatment for fatty liver, NALFD or HINOJOSA.     The patient was counseled regarding diet and exercise to achieve weight loss. The best diet for patients with fatty liver is one very low in carbohydrates and enriched with protein such as an Chriss's program.      The patient was told not to consume any food products and drinks containing fructose as this enhances hepatic fat synthesis. It will be difficult for him to loose weight since the BMI is only 27. There is no medication or vitamin supplements that we advocate for HINOJOSA. Using glitazones in patients without diabetes mellitus has been shown to reduce fat content in the liver but has no effect on fibrosis and is associated with weight gain. Vitamin E has also been used but the data is not very good and most experts no longer advocate this. The only medical treatments for HINOJOSA are though clinical trials. The patient would be a good candidate for enrollment into a clinical trial if found to have HINOJOSA. The patient would like to participate in a clinical trial.    Treatment of other medical problems in patients with chronic liver disease  There are no contraindications for the patient to take most medications that are necessary for treatment of other medical issues. Counseling for alcohol in patients with chronic liver disease  The patient was counseled regarding alcohol consumption and the effect of alcohol on chronic liver disease. The patient does not consume any significant amount of alcohol. Vaccinations   Vaccination for viral hepatitis A and B is not needed. The patient has serologic evidence of prior exposure or vaccination with immunity. Routine vaccinations against other bacterial and viral agents can be performed as indicated. Annual flu vaccination should be administered if indicated. ALLERGIES  No Known Allergies    MEDICATIONS  Current Outpatient Medications   Medication Sig    loratadine (CLARITIN) 10 mg tablet Take 10 mg by mouth.     budesonide (PULMICORT) 180 mcg/actuation aepb inhaler Take 2 Puffs by inhalation two (2) times a day.  albuterol (PROVENTIL VENTOLIN) 2.5 mg /3 mL (0.083 %) nebulizer solution 3 mL by Nebulization route every four (4) hours as needed for Wheezing or Shortness of Breath.  testosterone (ANDROGEL) 50 mg/5 gram (1 %) gel 5 g by TransDERmal route daily. 80mg daily    citalopram (CELEXA) 10 mg tablet Take  by mouth daily.  fluticasone (FLOVENT HFA) 44 mcg/actuation inhaler Take 2 Puffs by inhalation two (2) times a day.  cetirizine (ZYRTEC) 10 mg tablet Take 1 Tab by mouth daily.  pantoprazole (PROTONIX) 40 mg tablet Take 1 Tab by mouth daily. Indications: HEARTBURN    montelukast (SINGULAIR) 10 mg tablet Take 1 Tab by mouth daily.  triamcinolone acetonide (KENALOG) 0.1 % topical cream Apply  to affected area two (2) times a day. use thin layer    lithium 300 mg tablet Take 300 mg by mouth two (2) times a day.  methylphenidate (CONCERTA) 18 mg CR tablet Take  by mouth every morning. No current facility-administered medications for this visit. SYSTEM REVIEW NOT RELATED TO LIVER DISEASE OR REVIEWED ABOVE:  Constitution systems: Negative for fever, chills, weight gain, weight loss. Eyes: Negative for visual changes. ENT: Negative for sore throat, painful swallowing. Respiratory: Negative for cough, hemoptysis, SOB. Cardiology: Negative for chest pain, palpitations. GI:  Negative for constipation or diarrhea. : Negative for urinary frequency, dysuria, hematuria, nocturia. Skin: Negative for rash. Hematology: Negative for easy bruising, blood clots. Musculo-skelatal: Negative for back pain, muscle pain, weakness. Neurologic: Negative for headaches, dizziness, vertigo, memory problems not related to HE. Psychology: Negative for anxiety, depression. FAMILY HISTORY:  The patient is adopted from [place]. SOCIAL HISTORY:  The patient has never been . The patient has no children. The patient stopped using tobacco in 2/2019. The patient consumes alcohol on social occasions never in excess. The patient currently works full time for an [address] [employer]. PHYSICAL EXAMINATION:  Visit Vitals  /82 (BP 1 Location: Left arm, BP Patient Position: Sitting)   Pulse 74   Temp 97.3 °F (36.3 °C) (Tympanic)   Ht 5' 3\" (1.6 m)   Wt 155 lb 6.4 oz (70.5 kg)   SpO2 99%   BMI 27.53 kg/m²     General: No acute distress. Eyes: Sclera anicteric. ENT: No oral lesions. Thyroid normal.  Nodes: No adenopathy. Skin: No spider angiomata. No jaundice. No palmar erythema. Respiratory: Lungs clear to auscultation. Cardiovascular: Regular heart rate. No murmurs. No JVD. Abdomen: Soft non-tender. Liver size normal to percussion/palpation. Spleen not palpable. No obvious ascites. Extremities: No edema. No muscle wasting. No gross arthritic changes. Neurologic: Alert and oriented. Cranial nerves grossly intact. No asterixis.     LABORATORY STUDIES:  Liver [place] of [address] Units 2/26/2019 12/23/2016   WBC 3.4 - 10.8 x10E3/uL 3.8 6.7   ANC 1.4 - 7.0 x10E3/uL 2.0 3.5   HGB 13.0 - 17.7 g/dL 16.4 14.4    - 379 x10E3/uL 178 229   AST 0 - 40 IU/L 38 90 (H)   ALT 0 - 44 IU/L 33 162 (H)   Alk Phos 39 - 117 IU/L 168 (H) 176 (H)   Bili, Total 0.0 - 1.2 mg/dL 0.3 0.3   Bili, Direct 0.00 - 0.40 mg/dL 0.11 0.09   Albumin 3.5 - 5.5 g/dL 4.2 4.2   BUN 6 - 20 mg/dL 10 17   Creat 0.76 - 1.27 mg/dL 0.69 (L) 0.58 (L)   Na 134 - 144 mmol/L 141 141   K 3.5 - 5.2 mmol/L 4.8 4.2   Cl 96 - 106 mmol/L 102 102   CO2 20 - 29 mmol/L 23 23   Glucose 65 - 99 mg/dL 84 81       SEROLOGIES:  Serologies Latest Ref Rng & Units 12/23/2016   Hep A Ab, Total Negative Positive (A)   Hep B Surface Ag Negative Negative   Hep B Core Ab, Total Negative Negative   Hep B Surface AB QL  Reactive   Hep C Ab 0.0 - 0.9 s/co ratio <0.1   Ferritin 30 - 400 ng/mL 161   Iron % Saturation 15 - 55 % 21   FELY, IFA Negative   C-ANCA Neg:<1:20 titer <1:20   P-ANCA Neg:<1:20 titer <1:20   ANCA Neg:<1:20 titer <1:20   ASMCA 0 - 19 Units 12   M2 Ab 0.0 - 20.0 Units 6.3   Ceruloplasmin 16.0 - 31.0 mg/dL 32.2 (H)   Alpha-1 antitrypsin level 90 - 200 mg/dL 121     LIVER HISTOLOGY:  3/2017. Reviewed by Dr Shelby Bianchi at [place]. HINOJOSA with mild inflammation and mild ballooning. Pericellular fibrosis. 6/2019. FibroScan performed at [address]. EkPa was 8.2. IQR/med 10%. . The CAP score suggests fatty liver    ENDOSCOPIC PROCEDURES:  Not available or performed    RADIOLOGY:  1/2017. Ultrasound of liver. Normal appearing liver. No liver mass lesions. OTHER TESTING:  Not available or performed    FOLLOW-UP:  All of the issues listed above in the Assessment and Plan were discussed with the patient. All questions were answered. The patient expressed a clear understanding of the above. [address] [place] [address] in 2 weeks after liver biopsy.       MD Marilee Barrett 13 of [address], [address] [facility], [facility] 22.  [phone]  [address] [place] [address]

## 2019-06-11 NOTE — Clinical Note
6/22/19 Patient: Renetta Caro YOB: 1995 Date of Visit: 6/11/2019 Joanna Deal MD 
[address]  [address] [place] [address] VIA In Basket Dear Joanna Deal MD, Thank you for referring Mr. Angelina Welch to [address] Sujatha Jeffery for evaluation. My notes for this consultation are attached. If you have questions, please do not hesitate to call me. I look forward to following your patient along with you. Sincerely, Lilly Caruso MD

## 2019-06-11 NOTE — PROGRESS NOTES
Chief Complaint   Patient presents with    Follow-up     fibroscan     Visit Vitals  /82 (BP 1 Location: Left arm, BP Patient Position: Sitting)   Pulse 74   Temp 97.3 °F (36.3 °C) (Tympanic)   Ht 5' 3\" (1.6 m)   Wt 155 lb 6.4 oz (70.5 kg)   SpO2 99%   BMI 27.53 kg/m²     3 most recent PHQ Screens 10/5/2017   Little interest or pleasure in doing things Not at all   Feeling down, depressed, irritable, or hopeless Not at all   Total Score PHQ 2 0     Abuse Screening Questionnaire 2/26/2019   Do you ever feel afraid of your partner? N   Are you in a relationship with someone who physically or mentally threatens you? N   Is it safe for you to go home? Y     1. Have you been to the ER, urgent care clinic since your last visit? Hospitalized since your last visit? No    2. Have you seen or consulted any other health care providers outside of the [address] [place] [address] since your last visit? Include any pap smears or colon screening.  No

## 2019-06-12 LAB
ALBUMIN SERPL-MCNC: 4.3 G/DL (ref 3.5–5.5)
ALP SERPL-CCNC: 154 IU/L (ref 39–117)
ALT SERPL-CCNC: 30 IU/L (ref 0–44)
AST SERPL-CCNC: 25 IU/L (ref 0–40)
BASOPHILS # BLD AUTO: 0 X10E3/UL (ref 0–0.2)
BASOPHILS NFR BLD AUTO: 1 %
BILIRUB DIRECT SERPL-MCNC: 0.1 MG/DL (ref 0–0.4)
BILIRUB SERPL-MCNC: 0.3 MG/DL (ref 0–1.2)
BUN SERPL-MCNC: 13 MG/DL (ref 6–20)
BUN/CREAT SERPL: 18 (ref 9–20)
CALCIUM SERPL-MCNC: 9.3 MG/DL (ref 8.7–10.2)
CHLORIDE SERPL-SCNC: 104 MMOL/L (ref 96–106)
CO2 SERPL-SCNC: 23 MMOL/L (ref 20–29)
CREAT SERPL-MCNC: 0.72 MG/DL (ref 0.76–1.27)
EOSINOPHIL # BLD AUTO: 0.7 X10E3/UL (ref 0–0.4)
EOSINOPHIL NFR BLD AUTO: 11 %
ERYTHROCYTE [DISTWIDTH] IN BLOOD BY AUTOMATED COUNT: 14.3 % (ref 12.3–15.4)
GLUCOSE SERPL-MCNC: 86 MG/DL (ref 65–99)
HCT VFR BLD AUTO: 47.6 % (ref 37.5–51)
HGB BLD-MCNC: 16 G/DL (ref 13–17.7)
IMM GRANULOCYTES # BLD AUTO: 0 X10E3/UL (ref 0–0.1)
IMM GRANULOCYTES NFR BLD AUTO: 0 %
INR PPP: 1 (ref 0.8–1.2)
LYMPHOCYTES # BLD AUTO: 2.1 X10E3/UL (ref 0.7–3.1)
LYMPHOCYTES NFR BLD AUTO: 34 %
MCH RBC QN AUTO: 28.8 PG (ref 26.6–33)
MCHC RBC AUTO-ENTMCNC: 33.6 G/DL (ref 31.5–35.7)
MCV RBC AUTO: 86 FL (ref 79–97)
MONOCYTES # BLD AUTO: 0.5 X10E3/UL (ref 0.1–0.9)
MONOCYTES NFR BLD AUTO: 7 %
NEUTROPHILS # BLD AUTO: 3 X10E3/UL (ref 1.4–7)
NEUTROPHILS NFR BLD AUTO: 47 %
PLATELET # BLD AUTO: 197 X10E3/UL (ref 150–450)
POTASSIUM SERPL-SCNC: 4.3 MMOL/L (ref 3.5–5.2)
PROT SERPL-MCNC: 7.1 G/DL (ref 6–8.5)
PROTHROMBIN TIME: 10.5 SEC (ref 9.1–12)
RBC # BLD AUTO: 5.55 X10E6/UL (ref 4.14–5.8)
SODIUM SERPL-SCNC: 140 MMOL/L (ref 134–144)
WBC # BLD AUTO: 6.3 X10E3/UL (ref 3.4–10.8)

## 2019-08-21 ENCOUNTER — HOSPITAL ENCOUNTER (OUTPATIENT)
Age: 24
Setting detail: OUTPATIENT SURGERY
End: 2019-08-21
Attending: INTERNAL MEDICINE | Admitting: INTERNAL MEDICINE

## 2019-10-04 ENCOUNTER — APPOINTMENT (OUTPATIENT)
Dept: ULTRASOUND IMAGING | Age: 24
End: 2019-10-04
Attending: INTERNAL MEDICINE
Payer: COMMERCIAL

## 2019-10-04 ENCOUNTER — HOSPITAL ENCOUNTER (OUTPATIENT)
Age: 24
Setting detail: OUTPATIENT SURGERY
Discharge: HOME | End: 2019-10-04
Attending: INTERNAL MEDICINE | Admitting: INTERNAL MEDICINE
Payer: COMMERCIAL

## 2019-10-04 VITALS
DIASTOLIC BLOOD PRESSURE: 66 MMHG | HEART RATE: 62 BPM | TEMPERATURE: 98 F | OXYGEN SATURATION: 100 % | RESPIRATION RATE: 16 BRPM | SYSTOLIC BLOOD PRESSURE: 115 MMHG

## 2019-10-04 DIAGNOSIS — K75.81 NASH (NONALCOHOLIC STEATOHEPATITIS): ICD-10-CM

## 2019-10-04 PROCEDURE — 88313 SPECIAL STAINS GROUP 2: CPT

## 2019-10-04 PROCEDURE — 76040000019: Performed by: INTERNAL MEDICINE

## 2019-10-04 PROCEDURE — 88307 TISSUE EXAM BY PATHOLOGIST: CPT

## 2019-10-04 PROCEDURE — 76942 ECHO GUIDE FOR BIOPSY: CPT

## 2019-10-04 PROCEDURE — 77030014115: Performed by: INTERNAL MEDICINE

## 2019-10-04 PROCEDURE — 74011250636 HC RX REV CODE- 250/636: Performed by: INTERNAL MEDICINE

## 2019-10-04 RX ORDER — LIDOCAINE HYDROCHLORIDE 10 MG/ML
10 INJECTION INFILTRATION; PERINEURAL ONCE
Status: DISCONTINUED | OUTPATIENT
Start: 2019-10-04 | End: 2019-10-04 | Stop reason: HOSPADM

## 2019-10-04 RX ORDER — SODIUM CHLORIDE 0.9 % (FLUSH) 0.9 %
5-40 SYRINGE (ML) INJECTION AS NEEDED
Status: DISCONTINUED | OUTPATIENT
Start: 2019-10-04 | End: 2019-10-04 | Stop reason: HOSPADM

## 2019-10-04 RX ORDER — HYDROMORPHONE HYDROCHLORIDE 1 MG/ML
1 INJECTION, SOLUTION INTRAMUSCULAR; INTRAVENOUS; SUBCUTANEOUS
Status: DISCONTINUED | OUTPATIENT
Start: 2019-10-04 | End: 2019-10-04 | Stop reason: HOSPADM

## 2019-10-04 RX ORDER — ONDANSETRON 2 MG/ML
4 INJECTION INTRAMUSCULAR; INTRAVENOUS
Status: DISCONTINUED | OUTPATIENT
Start: 2019-10-04 | End: 2019-10-04 | Stop reason: HOSPADM

## 2019-10-04 RX ORDER — SODIUM CHLORIDE 0.9 % (FLUSH) 0.9 %
5-40 SYRINGE (ML) INJECTION EVERY 8 HOURS
Status: DISCONTINUED | OUTPATIENT
Start: 2019-10-04 | End: 2019-10-04 | Stop reason: HOSPADM

## 2019-10-04 RX ADMIN — HYDROMORPHONE HYDROCHLORIDE 1 MG: 1 INJECTION, SOLUTION INTRAMUSCULAR; INTRAVENOUS; SUBCUTANEOUS at 07:57

## 2019-10-04 NOTE — PROCEDURES
3340 [facility], MD, 9261 [address], Cite Missy Tello MD Missie Flicker, GENEVIEVE Epstein, [facility]-BC     Zeynep Espitia, [facility]-BC   Duong SanchesKALIP-DUARTE    Ivett Maren, [facility]       [facility] 136    at [address], [facility] Lucho Sheehan  22. [phone]    FAX: [address] [place] [address] - [address]    [phone]    FAX: [phone]         LIVER BIOPSY PROCEDURE NOTE    Fady Mansi  1995    INDICATIONS/PRE-OPERATIVE  DIAGNOSIS:  NAFLD    : Kiet Morrison MD    SEDATION: 1% Lidocaine injection 10 ml    PROCEDURE:  Informed consent to perform the procedure was obtained from the patient. The patient was positioned on the edge of the stretcher lying flat in the supine position. Ultrasound was utilized to image the liver. The diaphragm and any major mass lesion or vascular structures within the liver were identified. An appropriate site for liver biopsy was identified. The distance from the surface of the skin to the liver capsule was 3 cm. This area was prepped with betadine and draped in sterile fashion. The skin was infiltrated with 1% lidocaine. The deeper subcutanous tissues and liver capsule overlying the biopsy site were then infiltrated with 1% lidocaine until appropriate anesthesia was obtained. A small incision was made in the skin so the biopsy devise could be easily inserted. A total of 2 passes with the 16 gauge Bard biopsy devise was then made into the liver. Core(s) of liver tissue totaling 4 cm in length were obtained and placed into tissue fixative. A band aid was placed over the biopsy site.   The patient was then repositioned on the right side and transported to the recovery area on the stretcher for routine monitoring until discharge. The specimen was sent to pathology for processing via the normal transport mechanism. SPECIMEN REMOVED: Liver    ESTIMATED BLOOD LOSS: Negligible.       POST-OPERATIVE DIAGNOSIS: Same as Pre-operative Diagnosis    Shirley Hanks MD [facility], 2000 [facility] 22.  [phone]  [address] [place] [address]

## 2019-10-04 NOTE — DISCHARGE INSTRUCTIONS
3340 [facility], MD, [address]375 [address], Cite Lucia Tello MD Devota Lacrosse, PA-C Josue Horner, [facility]     Zeynep PLEITEZ Nupur, [facility]   Danilo Reis CAMERON-C    Josefina Rodriguez, [facility]       Franklinmala Echols [facility] 136    at [address], [address] [place] [address]    1400 [facility] 22.    [phone]    FAX: [address] [place] [address]    at [address], [address]    [phone]    FAX: [phone]         LIVER BIOPSY DISCHARGE Dennise Wong Fermin  1995  Date: 10/4/2019    DIET:    Jake López may resume your previous diet. ACTIVITIES:  Rest quietly the rest of today. You should not lift any objects more than 20 pounds for the next 2 days. If you work sitting down without strenuous activity you may return to work tomorrow. If you exert yourself or do heavy lifting at work you should take tomorrow off. Do not drive or operate hazardous machinery for 12 hours after you are discharged from this procedure. SPECIAL INSTRUCTIONS:  Do not use any aspirin or non-steroidal (Motin, Advil, Naproxen, etc) pain medications for the next 2 days. You may use extra-strength Tylenol (acetaminophen) if you experience pain or discomfort later today. Restarting blood thinners: If you were taking blood thinners prior to the procedure you can restart these in 2 days. Call the [facility] [address] office if you experience any of the following:  Persistent or severe abdominal pain. Persistent or severe abdominal distention. Fever and chills   Nausea and vomiting. New or unusual symptoms. Follow-up care: You should have a follow up appointment with Dr. Italo Fuentes to review the results of the liver biopsy results in 2 weeks.   If you do not have an appointment please call the office at the number listed above to schedule this. Other instructions: If you have any problems or questions call the [address] at the phone number listed above. DISCHARGE SUMMARY from Nurse:     The following personal items collected during your admission are returned to you:   Dental Appliance: Dental Appliances: None  Vision:    Hearing Aid:    Jewelry:    Clothing:    Other Valuables:    Valuables sent to safe:

## 2019-10-04 NOTE — H&P
3340 [facility], MD, 9627 [address], Cite Saranya Tello MD Alphonza Clifton, GENEVIEVE Montgomery, [facility]     Zeynep Espitia, [facility]   Reanna Silvaic, FNP-C    Stanbola Uriel, [facility]       Franklin Echols Jeremy De Ruelas 136    at [address], [facility] Lucho Sheehan  22. [phone]    FAX: [address] [place] [address], [address]    [phone]    FAX: [phone]         PRE-PROCEDURE NOTE - LIVER BIOPSY    H and P from last office visit reviewed. Allergies reviewed. Out-patient medication list reviewed. Patient Active Problem List   Diagnosis Code    Asthma J45.909    ADD (attention deficit disorder with hyperactivity)     Eczema L30.9    Delay, puberty E30.0    NAFLD (nonalcoholic fatty liver disease) K76.0       No Known Allergies    No current facility-administered medications on file prior to encounter. Current Outpatient Medications on File Prior to Encounter   Medication Sig Dispense Refill    testosterone (ANDROGEL TD) by TransDERmal route. 70 mg once daily. Gel.  loratadine (CLARITIN) 10 mg tablet Take 10 mg by mouth daily.  albuterol (PROVENTIL VENTOLIN) 2.5 mg /3 mL (0.083 %) nebulizer solution 3 mL by Nebulization route every four (4) hours as needed for Wheezing or Shortness of Breath. 1 Package 3       For liver biopsy to assess NALFD. The risks of the procedure were discussed with the patient. This included bleeding, pain, and puncture of other organs. All questions were answered. The patient wishes to proceed with the procedure. PHYSICAL EXAMINATION:  VS: per nursing note  General: No acute distress. Eyes: Sclera anicteric. ENT: No oral lesions.   Thyroid normal.  Nodes: No adenopathy. Skin: No spider angiomata. No jaundice. No palmar erythema. Respiratory: Lungs clear to auscultation. Cardiovascular: Regular heart rate. No murmurs. No JVD. Abdomen: Soft non-tender, liver size normal to percussion/palpation. Spleen not palpable. No obvious ascites. Extremities: No edema. No muscle wasting. No gross arthritic changes. Neurologic: Alert and oriented. Cranial nerves grossly intact. No asterixis. LABS:  Lab Results   Component Value Date/Time    WBC 6.3 06/11/2019 12:00 AM    HGB 16.0 06/11/2019 12:00 AM    HCT 47.6 06/11/2019 12:00 AM    PLATELET 739 06/81/7165 12:00 AM    MCV 86 06/11/2019 12:00 AM     Lab Results   Component Value Date/Time    INR 1.0 06/11/2019 12:00 AM    Prothrombin time 10.5 06/11/2019 12:00 AM       ASSESSMENT AND PLAN:  Liver biopsy under ultrasound guidance.     Dunia Bey MD  [address] [place] [address] [address] 22.  [phone]  [address]

## 2019-10-18 ENCOUNTER — OFFICE VISIT (OUTPATIENT)
Dept: HEMATOLOGY | Age: 24
End: 2019-10-18

## 2019-10-18 VITALS
OXYGEN SATURATION: 97 % | SYSTOLIC BLOOD PRESSURE: 106 MMHG | TEMPERATURE: 97.5 F | WEIGHT: 157 LBS | HEART RATE: 71 BPM | BODY MASS INDEX: 27.81 KG/M2 | DIASTOLIC BLOOD PRESSURE: 68 MMHG

## 2019-10-18 DIAGNOSIS — R74.8 ELEVATED LIVER ENZYMES: Primary | ICD-10-CM

## 2019-10-18 PROBLEM — R79.89 LOW TESTOSTERONE: Status: ACTIVE | Noted: 2019-10-18

## 2019-10-18 NOTE — Clinical Note
10/18/19 Patient: Celena Beatty YOB: 1995 Date of Visit: 10/18/2019 Chicho Lela MD 
N [address] [place] [address] VIA In Basket Dear Chicho Leal MD, Thank you for referring Mr. Jimenez Hcristina to [address] for evaluation. My notes for this consultation are attached. If you have questions, please do not hesitate to call me. I look forward to following your patient along with you. Sincerely, Sally Gardner MD

## 2019-10-18 NOTE — PROGRESS NOTES
Chief Complaint   Patient presents with    Follow-up     LBX F/U     1. Have you been to the ER, urgent care clinic since your last visit? Hospitalized since your last visit? No    2. Have you seen or consulted any other health care providers outside of the [address] [place] [address] since your last visit? Include any pap smears or colon screening. No      3 most recent PHQ Screens 10/18/2019   Little interest or pleasure in doing things Not at all   Feeling down, depressed, irritable, or hopeless Not at all   Total Score PHQ 2 0     Abuse Screening Questionnaire 10/18/2019   Do you ever feel afraid of your partner? N   Are you in a relationship with someone who physically or mentally threatens you? N   Is it safe for you to go home?  Y     Learning Assessment 10/18/2019   PRIMARY LEARNER Patient   HIGHEST LEVEL OF EDUCATION - PRIMARY LEARNER  -   BARRIERS PRIMARY LEARNER NONE   CO-LEARNER CAREGIVER No   PRIMARY LANGUAGE ENGLISH   LEARNER PREFERENCE PRIMARY DEMONSTRATION   ANSWERED BY patient   RELATIONSHIP SELF     Visit Vitals  /68 (BP 1 Location: Right arm, BP Patient Position: Sitting)   Pulse 71   Temp 97.5 °F (36.4 °C) (Tympanic)   Wt 157 lb (71.2 kg)   SpO2 97%   BMI 27.81 kg/m²

## 2019-10-18 NOTE — PROGRESS NOTES
3340 [facility] MD Marty, MD Felicia Beltrán PA-C Charm Carrier, [facility]-BC     Zeynep Espitia, [facility]   Osiris Romero P-DUARTE Byrnes, [facility]       Franklin Echols Jeremy De Ruelas 136    at [place] [address] [facility] [address], [address] Kalen RojasisingLucho pandya  22.    [phone]    FAX: [address] [place] [address], [address]    [phone]    FAX: [phone]       Patient Care Team:  Bryce Boyle MD as PCP - General (Family Practice)      Problem List  Date Reviewed: 6/22/2019          Codes Class Noted    Low testosterone ICD-10-CM: R79.89  ICD-9-CM: 790.99  10/18/2019        Delay, puberty ICD-10-CM: E30.0  ICD-9-CM: 259.0  11/3/2016        NAFLD (nonalcoholic fatty liver disease) ICD-10-CM: K76.0  ICD-9-CM: 571.8  11/3/2016        Eczema ICD-10-CM: L30.9  ICD-9-CM: 692.9  8/19/2013        Asthma ICD-10-CM: J45.909  ICD-9-CM: 493.90  Unknown        ADD (attention deficit disorder with hyperactivity) ICD-9-CM: 314.01  Unknown                [facility] for management of elevated liver enzymes. The active problem list, all pertinent past medical history, medications, liver histology, radiologic findings and laboratory findings related to the liver disorder were reviewed with the patient. The patient is a 24 y.o.  male who was found to have abnormalities in liver transaminases and alkaline phosphate in 3/2016 when he was living in [facility]. He moved to [place] and was seen for the first time in 12/2016. The then moved to [address] [place] [address] in 2017 but moved back to [place] in 6/2019. A liver biopsy was performed at [facility] in 3/2017.   This demonstrated NAFLD with stage 2 fibrosis. Assessment of liver fibrosis with Fibroscan in 6/2019 demonstrated a liver stiffness of 8.2 kPa which correlates with stage 2 septal fibrosis. The CAP score of 306 suggests fatty liver. The patient underwent a liver biopsy in 10/2019. The procedure was well tolerated. I have personally reviewed the liver biopsy slides. This demonstrates normal liver. The patient has no symptoms which could be attributed to the liver disorder. The patient completes all daily activities without any functional limitations. The patient has not experienced fatigue, pain in the right side over the liver, yellowing of the eyes or skin, dark urine,       ASSESSMENT AND PLAN:  NAFLD  The diagnosis is based upon liver biopsy, Fiboscan CAP score, serologic studies that are negative for other causes of chronic liver disease,   A liver biopsy performed in 3/2017 shows features of mild HINOJOSA with pericellular fibrosis. Elastography performed in 6/2019 suggests a high CAP score consistent with fatty liver and stage 2 septal fibrosis. A repeat liver biopsy in 10/2019 demonstrates a normal appearing liver. There is no inflammation, no steatosis and no fibrosis. Since the previous biopsy in 2017 the patient was found to have low testosterone and started on Testosterone replacement therapy. With this he has lost about 15 pounds. I suspect the fatty liver was related to the low Testosterone and excess weight he had gained as a result of this. Liver transaminases are now normal.  ALP is has come down significantly but is still mildly elevated. Liver function is normal.  The platelet count is normal.    I suspect the ALP will continue to decline as well. Will continue to monitor liver enzymes at 6 months intervals and repeat another Fibroscan in 6 months.     Treatment of other medical problems in patients with chronic liver disease  There are no contraindications for the patient to take most medications that are necessary for treatment of other medical issues. Counseling for alcohol in patients with chronic liver disease  The patient was counseled regarding alcohol consumption and the effect of alcohol on chronic liver disease. The patient does not consume any significant amount of alcohol. Vaccinations   Vaccination for viral hepatitis A and B is not needed. The patient has serologic evidence of prior exposure or vaccination with immunity. Routine vaccinations against other bacterial and viral agents can be performed as indicated. Annual flu vaccination should be administered if indicated. ALLERGIES  No Known Allergies    MEDICATIONS  Current Outpatient Medications   Medication Sig    testosterone (ANDROGEL TD) by TransDERmal route. 70 mg once daily. Gel.  loratadine (CLARITIN) 10 mg tablet Take 10 mg by mouth daily.  albuterol (PROVENTIL VENTOLIN) 2.5 mg /3 mL (0.083 %) nebulizer solution 3 mL by Nebulization route every four (4) hours as needed for Wheezing or Shortness of Breath. No current facility-administered medications for this visit. SYSTEM REVIEW NOT RELATED TO LIVER DISEASE OR REVIEWED ABOVE:  Constitution systems: Negative for fever, chills, weight gain, weight loss. Eyes: Negative for visual changes. ENT: Negative for sore throat, painful swallowing. Respiratory: Negative for cough, hemoptysis, SOB. Cardiology: Negative for chest pain, palpitations. GI:  Negative for constipation or diarrhea. : Negative for urinary frequency, dysuria, hematuria, nocturia. Skin: Negative for rash. Hematology: Negative for easy bruising, blood clots. Musculo-skelatal: Negative for back pain, muscle pain, weakness. Neurologic: Negative for headaches, dizziness, vertigo, memory problems not related to HE. Psychology: Negative for anxiety, depression. FAMILY HISTORY:  The patient is adopted from [place].     SOCIAL HISTORY:  The patient has never been . The patient has no children. The patient stopped using tobacco in 2/2019. The patient consumes alcohol on social occasions never in excess. The patient currently works full time for an [address] [email or web address]. PHYSICAL EXAMINATION:  Visit Vitals  /68 (BP 1 Location: Right arm, BP Patient Position: Sitting)   Pulse 71   Temp 97.5 °F (36.4 °C) (Tympanic)   Wt 157 lb (71.2 kg)   SpO2 97%   BMI 27.81 kg/m²     General: No acute distress. Eyes: Sclera anicteric. ENT: No oral lesions. Thyroid normal.  Nodes: No adenopathy. Skin: No spider angiomata. No jaundice. No palmar erythema. Respiratory: Lungs clear to auscultation. Cardiovascular: Regular heart rate. No murmurs. No JVD. Abdomen: Soft non-tender. Liver size normal to percussion/palpation. Spleen not palpable. No obvious ascites. Extremities: No edema. No muscle wasting. No gross arthritic changes. Neurologic: Alert and oriented. Cranial nerves grossly intact. No asterixis.     LABORATORY STUDIES:  Liver [place] of [address] Units 2/26/2019 12/23/2016   WBC 3.4 - 10.8 x10E3/uL 3.8 6.7   ANC 1.4 - 7.0 x10E3/uL 2.0 3.5   HGB 13.0 - 17.7 g/dL 16.4 14.4    - 379 x10E3/uL 178 229   AST 0 - 40 IU/L 38 90 (H)   ALT 0 - 44 IU/L 33 162 (H)   Alk Phos 39 - 117 IU/L 168 (H) 176 (H)   Bili, Total 0.0 - 1.2 mg/dL 0.3 0.3   Bili, Direct 0.00 - 0.40 mg/dL 0.11 0.09   Albumin 3.5 - 5.5 g/dL 4.2 4.2   BUN 6 - 20 mg/dL 10 17   Creat 0.76 - 1.27 mg/dL 0.69 (L) 0.58 (L)   Na 134 - 144 mmol/L 141 141   K 3.5 - 5.2 mmol/L 4.8 4.2   Cl 96 - 106 mmol/L 102 102   CO2 20 - 29 mmol/L 23 23   Glucose 65 - 99 mg/dL 84 81     SEROLOGIES:  Serologies Latest Ref Rng & Units 12/23/2016   Hep A Ab, Total Negative Positive (A)   Hep B Surface Ag Negative Negative   Hep B Core Ab, Total Negative Negative   Hep B Surface AB QL  Reactive   Hep C Ab 0.0 - 0.9 s/co ratio <0.1   Ferritin 30 - 400 ng/mL 161   Iron % Saturation 15 - 55 % 21   FELY, IFA  Negative   C-ANCA Neg:<1:20 titer <1:20   P-ANCA Neg:<1:20 titer <1:20   ANCA Neg:<1:20 titer <1:20   ASMCA 0 - 19 Units 12   M2 Ab 0.0 - 20.0 Units 6.3   Ceruloplasmin 16.0 - 31.0 mg/dL 32.2 (H)   Alpha-1 antitrypsin level 90 - 200 mg/dL 121     LIVER HISTOLOGY:  3/2017. Reviewed by Dr De Van at [place]. HINOJOSA with mild inflammation and mild ballooning. Pericellular fibrosis. 6/2019. FibroScan performed at [address]. EkPa was 8.2. IQR/med 10%. . The CAP score suggests fatty liver    ENDOSCOPIC PROCEDURES:  Not available or performed    RADIOLOGY:  1/2017. Ultrasound of liver. Normal appearing liver. No liver mass lesions. OTHER TESTING:  Not available or performed    FOLLOW-UP:  All of the issues listed above in the Assessment and Plan were discussed with the patient. All questions were answered. The patient expressed a clear understanding of the above. [address] [place] [address] in 6 months for Fibroscan to review all data and determine the treatment plan.       MD [facility] 13 of [address], [address] [place] [address] 22.  [phone]  [address]

## 2019-10-19 LAB
ALBUMIN SERPL-MCNC: 4.5 G/DL (ref 3.5–5.5)
ALP SERPL-CCNC: 145 IU/L (ref 39–117)
ALT SERPL-CCNC: 45 IU/L (ref 0–44)
AST SERPL-CCNC: 33 IU/L (ref 0–40)
BILIRUB DIRECT SERPL-MCNC: 0.15 MG/DL (ref 0–0.4)
BILIRUB SERPL-MCNC: 0.6 MG/DL (ref 0–1.2)
PROT SERPL-MCNC: 7.4 G/DL (ref 6–8.5)

## 2020-01-02 ENCOUNTER — OFFICE VISIT (OUTPATIENT)
Dept: FAMILY MEDICINE CLINIC | Age: 25
End: 2020-01-02

## 2020-01-02 VITALS
DIASTOLIC BLOOD PRESSURE: 85 MMHG | SYSTOLIC BLOOD PRESSURE: 119 MMHG | HEIGHT: 63 IN | OXYGEN SATURATION: 96 % | WEIGHT: 149 LBS | BODY MASS INDEX: 26.4 KG/M2 | RESPIRATION RATE: 16 BRPM | HEART RATE: 62 BPM | TEMPERATURE: 98.6 F

## 2020-01-02 DIAGNOSIS — Z20.2 STD EXPOSURE: ICD-10-CM

## 2020-01-02 DIAGNOSIS — J45.909 MODERATE ASTHMA WITHOUT COMPLICATION, UNSPECIFIED WHETHER PERSISTENT: Primary | ICD-10-CM

## 2020-01-02 NOTE — PROGRESS NOTES
Chief Complaint   Patient presents with   [facility]     Patient presents in office today for STD testing. No concerns. 1. Have you been to the ER, urgent care clinic since your last visit? Hospitalized since your last visit? Yes When: 12/23/19 [facility] Reason for visit: sprained wrist    2. Have you seen or consulted any other health care providers outside of the [facility] since your last visit? Include any pap smears or colon screening. No    Learning Assessment 10/18/2019   PRIMARY LEARNER Patient   HIGHEST LEVEL OF EDUCATION - PRIMARY LEARNER  -   BARRIERS PRIMARY LEARNER NONE   CO-LEARNER CAREGIVER No   PRIMARY LANGUAGE ENGLISH   LEARNER PREFERENCE PRIMARY DEMONSTRATION   ANSWERED BY patient   RELATIONSHIP SELF       Chief Complaint   Patient presents with   [facility]     He is a 21 y.o. male who presents for evalution. Reviewed PmHx, RxHx, FmHx, SocHx, AllgHx and updated and dated in the chart. Patient Active Problem List    Diagnosis    Low testosterone    Delay, puberty    NAFLD (nonalcoholic fatty liver disease)    Eczema    Asthma    ADD (attention deficit disorder with hyperactivity)       Review of Systems - negative except as listed above in the HPI    Objective:     Vitals:    01/02/20 1508   BP: 119/85   Pulse: 62   Resp: 16   Temp: 98.6 °F (37 °C)   TempSrc: Oral   SpO2: 96%   Weight: 149 lb (67.6 kg)   Height: 5' 3\" (1.6 m)     Physical Examination: General appearance - alert, well appearing, and in no distress      Assessment/ Plan:   Diagnoses and all orders for this visit:    1. Moderate asthma without complication, unspecified whether persistent  -stable and no sxs    2. STD exposure  -     HIV 1/2 AG/AB, 4TH GENERATION,W RFLX CONFIRM; Future           I have discussed the diagnosis with the patient and the intended plan as seen in the above orders. The patient understands and agrees with the plan.  The patient has received an after-visit summary and questions were answered concerning future plans. Medication Side Effects and Warnings were discussed with patient  Patient Labs were reviewed and or requested:  Patient Past Records were reviewed and or requested    Marlin Gauthier M.D. There are no Patient Instructions on file for this visit.

## 2020-01-03 LAB — HIV 1+2 AB+HIV1 P24 AG SERPL QL IA: NON REACTIVE

## 2020-06-03 ENCOUNTER — TELEPHONE (OUTPATIENT)
Dept: FAMILY MEDICINE CLINIC | Age: 25
End: 2020-06-03

## 2020-06-03 NOTE — TELEPHONE ENCOUNTER
Called and spoke to patient. [facility])  Patient states understanding to obtain care locally as he is moving to [facility].

## 2020-06-03 NOTE — TELEPHONE ENCOUNTER
Patient called wishes Prednisone to be called for cold symptoms, cough, no fever - stated not COVID.     [place] on [address] [facility]    Patient may be contacted at

## 2022-03-20 PROBLEM — R79.89 LOW TESTOSTERONE: Status: ACTIVE | Noted: 2019-10-18

## 2023-05-24 RX ORDER — LORATADINE 10 MG/1
10 TABLET ORAL DAILY
COMMUNITY

## 2023-05-24 RX ORDER — ALBUTEROL SULFATE 2.5 MG/3ML
2.5 SOLUTION RESPIRATORY (INHALATION) EVERY 4 HOURS PRN
COMMUNITY
Start: 2013-04-22

## (undated) DEVICE — TRAY BX SFT TISS W/ RUL ALC PREP PD FEN DRP TWL LUERLOCK